# Patient Record
Sex: FEMALE | Race: WHITE | NOT HISPANIC OR LATINO | Employment: PART TIME | URBAN - METROPOLITAN AREA
[De-identification: names, ages, dates, MRNs, and addresses within clinical notes are randomized per-mention and may not be internally consistent; named-entity substitution may affect disease eponyms.]

---

## 2023-06-15 LAB
ALANINE AMINOTRANSFERASE (SGPT) (U/L) IN SER/PLAS: 15 U/L (ref 7–45)
ALBUMIN (G/DL) IN SER/PLAS: 4.3 G/DL (ref 3.4–5)
ALKALINE PHOSPHATASE (U/L) IN SER/PLAS: 63 U/L (ref 33–110)
ANION GAP IN SER/PLAS: 13 MMOL/L (ref 10–20)
ASPARTATE AMINOTRANSFERASE (SGOT) (U/L) IN SER/PLAS: 15 U/L (ref 9–39)
BILIRUBIN TOTAL (MG/DL) IN SER/PLAS: 0.5 MG/DL (ref 0–1.2)
CALCIUM (MG/DL) IN SER/PLAS: 9.5 MG/DL (ref 8.6–10.3)
CARBON DIOXIDE, TOTAL (MMOL/L) IN SER/PLAS: 26 MMOL/L (ref 21–32)
CHLORIDE (MMOL/L) IN SER/PLAS: 101 MMOL/L (ref 98–107)
CHOLESTEROL (MG/DL) IN SER/PLAS: 157 MG/DL (ref 0–199)
CHOLESTEROL IN HDL (MG/DL) IN SER/PLAS: 68.4 MG/DL
CHOLESTEROL/HDL RATIO: 2.3
CREATININE (MG/DL) IN SER/PLAS: 1.02 MG/DL (ref 0.5–1.05)
GFR FEMALE: 68 ML/MIN/1.73M2
GLUCOSE (MG/DL) IN SER/PLAS: 72 MG/DL (ref 74–99)
LDL: 53 MG/DL (ref 0–99)
POTASSIUM (MMOL/L) IN SER/PLAS: 4 MMOL/L (ref 3.5–5.3)
PROTEIN TOTAL: 7.3 G/DL (ref 6.4–8.2)
SODIUM (MMOL/L) IN SER/PLAS: 136 MMOL/L (ref 136–145)
THYROTROPIN (MIU/L) IN SER/PLAS BY DETECTION LIMIT <= 0.05 MIU/L: 0.89 MIU/L (ref 0.44–3.98)
TRIGLYCERIDE (MG/DL) IN SER/PLAS: 177 MG/DL (ref 0–149)
URATE (MG/DL) IN SER/PLAS: 3.6 MG/DL (ref 2.3–6.7)
UREA NITROGEN (MG/DL) IN SER/PLAS: 21 MG/DL (ref 6–23)
VLDL: 35 MG/DL (ref 0–40)

## 2023-06-19 LAB
AMPHETAMINE (PRESENCE) IN URINE BY SCREEN METHOD: ABNORMAL
BARBITURATES PRESENCE IN URINE BY SCREEN METHOD: ABNORMAL
BENZODIAZEPINE (PRESENCE) IN URINE BY SCREEN METHOD: ABNORMAL
CANNABINOIDS IN URINE BY SCREEN METHOD: ABNORMAL
COCAINE (PRESENCE) IN URINE BY SCREEN METHOD: ABNORMAL
DRUG SCREEN COMMENT URINE: ABNORMAL
FENTANYL URINE: ABNORMAL
METHADONE (PRESENCE) IN URINE BY SCREEN METHOD: ABNORMAL
OPIATES (PRESENCE) IN URINE BY SCREEN METHOD: ABNORMAL
OXYCODONE (PRESENCE) IN URINE BY SCREEN METHOD: ABNORMAL
PHENCYCLIDINE (PRESENCE) IN URINE BY SCREEN METHOD: ABNORMAL

## 2023-06-22 LAB
AMPHETAMINES,URINE: >5000 NG/ML
MDA,URINE: <200 NG/ML
MDEA,URINE: <200 NG/ML
MDMA,UR: <200 NG/ML
METHAMPHETAMINE QUANTITATIVE URINE: <200 NG/ML
PHENTERMINE,UR: <200 NG/ML

## 2023-06-23 LAB
11-NOR-9-CARBOXY-THC, URN, QUANT: 391 NG/ML
7-AMINOCLONAZEPAM: <25 NG/ML
ALPHA-HYDROXYALPRAZOLAM: <25 NG/ML
ALPHA-HYDROXYMIDAZOLAM: <25 NG/ML
ALPRAZOLAM: <25 NG/ML
CHLORDIAZEPOXIDE: <25 NG/ML
CLONAZEPAM: <25 NG/ML
DIAZEPAM: <25 NG/ML
LORAZEPAM: <25 NG/ML
MIDAZOLAM: <25 NG/ML
NORDIAZEPAM: 297 NG/ML
OXAZEPAM: 539 NG/ML
TEMAZEPAM: 844 NG/ML

## 2023-08-01 LAB
7-AMINOCLONAZEPAM: <25 NG/ML
ALPHA-HYDROXYALPRAZOLAM: <25 NG/ML
ALPHA-HYDROXYMIDAZOLAM: <25 NG/ML
ALPRAZOLAM: <25 NG/ML
AMPHETAMINES,URINE: 1155 NG/ML
CHLORDIAZEPOXIDE: <25 NG/ML
CLONAZEPAM: <25 NG/ML
DIAZEPAM: <25 NG/ML
LORAZEPAM: <25 NG/ML
MDA,URINE: <200 NG/ML
MDEA,URINE: <200 NG/ML
MDMA,UR: <200 NG/ML
METHAMPHETAMINE QUANTITATIVE URINE: <200 NG/ML
MIDAZOLAM: <25 NG/ML
NORDIAZEPAM: 366 NG/ML
OXAZEPAM: >1000 NG/ML
PHENTERMINE,UR: <200 NG/ML
TEMAZEPAM: >1000 NG/ML

## 2023-08-02 LAB — 11-NOR-9-CARBOXY-THC, URN, QUANT: >500 NG/ML

## 2023-08-29 LAB
AMPHETAMINE (PRESENCE) IN URINE BY SCREEN METHOD: ABNORMAL
BARBITURATES PRESENCE IN URINE BY SCREEN METHOD: ABNORMAL
BENZODIAZEPINE (PRESENCE) IN URINE BY SCREEN METHOD: ABNORMAL
CANNABINOIDS IN URINE BY SCREEN METHOD: ABNORMAL
COCAINE (PRESENCE) IN URINE BY SCREEN METHOD: ABNORMAL
DRUG SCREEN COMMENT URINE: ABNORMAL
FENTANYL URINE: ABNORMAL
OPIATES (PRESENCE) IN URINE BY SCREEN METHOD: ABNORMAL
OXYCODONE (PRESENCE) IN URINE BY SCREEN METHOD: ABNORMAL
PHENCYCLIDINE (PRESENCE) IN URINE BY SCREEN METHOD: ABNORMAL

## 2023-09-01 LAB
7-AMINOCLONAZEPAM: <25 NG/ML
ALPHA-HYDROXYALPRAZOLAM: <25 NG/ML
ALPHA-HYDROXYMIDAZOLAM: <25 NG/ML
ALPRAZOLAM: <25 NG/ML
CHLORDIAZEPOXIDE: <25 NG/ML
CLONAZEPAM: <25 NG/ML
DIAZEPAM: <25 NG/ML
LORAZEPAM: <25 NG/ML
MIDAZOLAM: <25 NG/ML
NORDIAZEPAM: 56 NG/ML
OXAZEPAM: 201 NG/ML
TEMAZEPAM: 490 NG/ML

## 2023-09-02 LAB
11-NOR-9-CARBOXY-THC, URN, QUANT: 336 NG/ML
AMPHETAMINES,URINE: <50 NG/ML
MDA,URINE: <200 NG/ML
MDEA,URINE: <200 NG/ML
MDMA,UR: <200 NG/ML
METHAMPHETAMINE QUANTITATIVE URINE: <200 NG/ML
PHENTERMINE,UR: <200 NG/ML

## 2023-09-13 LAB — SARS-COV-2 RESULT: NOT DETECTED

## 2023-11-15 ENCOUNTER — OFFICE VISIT (OUTPATIENT)
Dept: PRIMARY CARE | Facility: CLINIC | Age: 48
End: 2023-11-15
Payer: COMMERCIAL

## 2023-11-15 VITALS
OXYGEN SATURATION: 99 % | TEMPERATURE: 98.3 F | WEIGHT: 233.6 LBS | DIASTOLIC BLOOD PRESSURE: 82 MMHG | SYSTOLIC BLOOD PRESSURE: 138 MMHG | HEIGHT: 63 IN | HEART RATE: 90 BPM | BODY MASS INDEX: 41.39 KG/M2

## 2023-11-15 DIAGNOSIS — E66.01 MORBID OBESITY WITH BMI OF 40.0-44.9, ADULT (MULTI): Primary | ICD-10-CM

## 2023-11-15 PROCEDURE — 99214 OFFICE O/P EST MOD 30 MIN: CPT | Performed by: FAMILY MEDICINE

## 2023-11-15 PROCEDURE — 3008F BODY MASS INDEX DOCD: CPT | Performed by: FAMILY MEDICINE

## 2023-11-15 RX ORDER — DEXTROAMPHETAMINE SACCHARATE, AMPHETAMINE ASPARTATE MONOHYDRATE, DEXTROAMPHETAMINE SULFATE AND AMPHETAMINE SULFATE 7.5; 7.5; 7.5; 7.5 MG/1; MG/1; MG/1; MG/1
CAPSULE, EXTENDED RELEASE ORAL
COMMUNITY
Start: 2010-07-21

## 2023-11-15 RX ORDER — ROSUVASTATIN CALCIUM 10 MG/1
10 TABLET, COATED ORAL DAILY
COMMUNITY
End: 2024-01-05 | Stop reason: SDUPTHER

## 2023-11-15 RX ORDER — LAMOTRIGINE 25 MG/1
TABLET ORAL
COMMUNITY
Start: 2016-02-05

## 2023-11-15 RX ORDER — TEMAZEPAM 15 MG/1
15 CAPSULE ORAL DAILY PRN
COMMUNITY

## 2023-11-15 RX ORDER — RAMELTEON 8 MG/1
8 TABLET ORAL DAILY PRN
COMMUNITY
Start: 2023-10-03

## 2023-11-15 RX ORDER — DULAGLUTIDE 0.75 MG/.5ML
0.75 INJECTION, SOLUTION SUBCUTANEOUS
Qty: 2 ML | Refills: 0 | Status: SHIPPED | OUTPATIENT
Start: 2023-11-15 | End: 2023-11-22 | Stop reason: ALTCHOICE

## 2023-11-15 RX ORDER — ARIPIPRAZOLE 20 MG/1
20 TABLET ORAL
COMMUNITY
Start: 2023-06-27

## 2023-11-15 RX ORDER — PRAZOSIN HYDROCHLORIDE 2 MG/1
2 CAPSULE ORAL NIGHTLY
COMMUNITY
Start: 2023-06-27

## 2023-11-15 NOTE — PROGRESS NOTES
"Subjective   Patient ID: Ariane Rhodes is a 47 y.o. female who presents for follow up after seeing Neurology and Psychiatry     HPI     States she has seen both Neurology and Psychiatry since her last visit here in the office. Is receiving treatment by her Psychiatrist at [facility] for her bipolar and ADHD, and receiving therapy. States she did see Dr. [facility], had MRI Brain which came back normal but is concerned because her previous diagnosis of Neurocognitive disorder and MRI in 2022 which showed white matter changes. Is concerned because she is still having memory issues. Has appointment scheduled with cognitive specialist next month.     Also would like to discuss weight loss medication options. Pt has lost approximately 26 lbs since spring time on her own with lifestyle changes such as intermittent fasting and decreased carbs and not eating fast food. Is interested in pharmacologic treatment at this time.     Review of Systems    Objective   /82   Pulse 90   Temp 36.8 °C (98.3 °F)   Ht 1.6 m (5' 3\")   Wt 106 kg (233 lb 9.6 oz)   SpO2 99%   BMI 41.38 kg/m²     Physical Exam  Constitutional:       General: She is awake.      Appearance: Normal appearance. She is well-developed.   HENT:      Head: Normocephalic and atraumatic.   Cardiovascular:      Rate and Rhythm: Normal rate.   Pulmonary:      Effort: Pulmonary effort is normal.   Musculoskeletal:      Cervical back: Normal range of motion and neck supple.      Right lower leg: No edema.      Left lower leg: No edema.   Skin:     General: Skin is warm and dry.      Findings: No lesion or rash.   Neurological:      General: No focal deficit present.      Mental Status: She is alert and oriented to person, place, and time.      Cranial Nerves: No facial asymmetry.      Motor: Motor function is intact.      Gait: Gait is intact.   Psychiatric:         Attention and Perception: Attention normal.         Mood and Affect: Mood and affect " normal.         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    Discussed neurocognitive disorder with pt at length, primarily going over previous results of her MRI and discussing interpretation of results. She does see neurology and psychiatry and has neuropsych evaluations, and will have more in the future to monitor progression of neurocognitive disorder. Believes she is in a manic episode currently d/t anxiety over the neurocognitive disorder, was asking about anything she can take that is natural like the medical marijuana she uses but possibly more cost-effective. Suggested Ashwagandha if she is looking for a natural supplement, but did caution to check with psychiatry about taking it with her other medications and strongly recommended doing her own research on this. She was also asking about TMS therapy, but did let her know that this is something that is better discussed with psychiatry. She is also planning on seeing a cognitive specialist on 12/6/23 to further discuss her cognitive deficits. Regarding weight loss, is unable to use Topamax. Suggested GLP-1s. Discussed side effects of medication and how the medication worked. Pt interested in this -- will call in Trulicity, will look into other options that are specifically for obesity if not approved d/t using this for Dx of obesity vs. DM. Will plan to see pt back 1 month after initiating the medication, also let her know that she can come in for education on how to use the medication if she is uncomfortable with starting herself.

## 2023-11-26 RX ORDER — SEMAGLUTIDE 0.25 MG/.5ML
0.25 INJECTION, SOLUTION SUBCUTANEOUS
Qty: 2 ML | Refills: 0 | Status: SHIPPED | OUTPATIENT
Start: 2023-11-26 | End: 2024-01-11

## 2023-12-01 DIAGNOSIS — M10.9 GOUT, UNSPECIFIED CAUSE, UNSPECIFIED CHRONICITY, UNSPECIFIED SITE: ICD-10-CM

## 2023-12-01 DIAGNOSIS — E03.9 HYPOTHYROIDISM, ADULT: ICD-10-CM

## 2023-12-01 DIAGNOSIS — I10 BENIGN HYPERTENSION: ICD-10-CM

## 2023-12-01 RX ORDER — ALLOPURINOL 300 MG/1
300 TABLET ORAL DAILY
Qty: 30 TABLET | Refills: 0 | Status: SHIPPED | OUTPATIENT
Start: 2023-12-01 | End: 2023-12-31

## 2023-12-01 RX ORDER — LOSARTAN POTASSIUM 50 MG/1
50 TABLET ORAL DAILY
Qty: 30 TABLET | Refills: 0 | Status: SHIPPED | OUTPATIENT
Start: 2023-12-01 | End: 2024-01-05 | Stop reason: SDUPTHER

## 2023-12-01 RX ORDER — LEVOTHYROXINE SODIUM 75 UG/1
75 TABLET ORAL DAILY
Qty: 30 TABLET | Refills: 0 | Status: SHIPPED | OUTPATIENT
Start: 2023-12-01 | End: 2024-01-05 | Stop reason: SDUPTHER

## 2023-12-05 ENCOUNTER — OFFICE VISIT (OUTPATIENT)
Dept: URGENT CARE | Facility: CLINIC | Age: 48
End: 2023-12-05
Payer: COMMERCIAL

## 2023-12-05 ENCOUNTER — TELEPHONE (OUTPATIENT)
Dept: PRIMARY CARE | Facility: CLINIC | Age: 48
End: 2023-12-05

## 2023-12-05 VITALS
HEART RATE: 100 BPM | WEIGHT: 232 LBS | BODY MASS INDEX: 41.11 KG/M2 | DIASTOLIC BLOOD PRESSURE: 86 MMHG | HEIGHT: 63 IN | SYSTOLIC BLOOD PRESSURE: 121 MMHG

## 2023-12-05 DIAGNOSIS — M10.479 OTHER SECONDARY ACUTE GOUT OF ANKLE, UNSPECIFIED LATERALITY: Primary | ICD-10-CM

## 2023-12-05 PROCEDURE — 96372 THER/PROPH/DIAG INJ SC/IM: CPT | Performed by: PHYSICIAN ASSISTANT

## 2023-12-05 PROCEDURE — 99213 OFFICE O/P EST LOW 20 MIN: CPT | Performed by: PHYSICIAN ASSISTANT

## 2023-12-05 PROCEDURE — 3008F BODY MASS INDEX DOCD: CPT | Performed by: PHYSICIAN ASSISTANT

## 2023-12-05 RX ORDER — OLANZAPINE 10 MG/1
10 TABLET ORAL NIGHTLY
COMMUNITY
Start: 2023-12-01

## 2023-12-05 RX ORDER — PREDNISONE 20 MG/1
TABLET ORAL
Qty: 12 TABLET | Refills: 0 | Status: SHIPPED | OUTPATIENT
Start: 2023-12-05 | End: 2023-12-11

## 2023-12-05 RX ORDER — DICLOFENAC POTASSIUM 50 MG/1
TABLET, FILM COATED ORAL
COMMUNITY
Start: 2023-11-03

## 2023-12-05 RX ORDER — LITHIUM CARBONATE 600 MG/1
CAPSULE ORAL
COMMUNITY
Start: 2023-12-04 | End: 2024-01-11 | Stop reason: WASHOUT

## 2023-12-05 RX ORDER — CLONIDINE HYDROCHLORIDE 0.1 MG/1
TABLET ORAL
COMMUNITY
Start: 2023-11-22

## 2023-12-05 RX ORDER — DIAZEPAM 10 MG/1
1 TABLET ORAL 2 TIMES DAILY PRN
COMMUNITY

## 2023-12-05 RX ORDER — KETOROLAC TROMETHAMINE 30 MG/ML
60 INJECTION, SOLUTION INTRAMUSCULAR; INTRAVENOUS ONCE
Status: COMPLETED | OUTPATIENT
Start: 2023-12-05 | End: 2023-12-05

## 2023-12-05 RX ADMIN — KETOROLAC TROMETHAMINE 60 MG: 30 INJECTION, SOLUTION INTRAMUSCULAR; INTRAVENOUS at 19:57

## 2023-12-05 NOTE — TELEPHONE ENCOUNTER
Cover my meds Denied Wegovy 0.25mg/0.5 ml.  Would you like to send in a different medication or try to appeal?

## 2023-12-06 NOTE — TELEPHONE ENCOUNTER
Printed up the appeal form and put on your desk. I can type info into Cover My Meds if you are not able to get on to fill out.

## 2023-12-06 NOTE — PROGRESS NOTES
"Subjective     Ariane Rhodes is a 47 y.o. female who presents for Ankle Pain.    States that she started 2 days ago with left posterior ankle and left lateral ankle swelling, redness, warmth x 2 days. She has a Hx of gout attacks, had one just 2 weeks ago. She just had steak and shrimp just a few days prior to getting this most recent attack. Tried to take 2 prednisone without much relief. \"Feels like a lot of pressure,\" and the pain is worse with walking on it. Denies fever, chills, red streaking, injury, numbness/tingling. Feels similar to her previous gout attacks.           All other systems reviewed and are negative unless otherwise noted in the HPI.    /86 (BP Location: Right arm, Patient Position: Sitting, BP Cuff Size: Large adult)   Pulse 100   Ht 1.6 m (5' 3\")   Wt 105 kg (232 lb)   BMI 41.10 kg/m²    Vital signs have been reviewed and are stable.     Objective     Physical Exam  Constitutional:       General: She is awake.      Appearance: Normal appearance. She is well-developed.   HENT:      Head: Normocephalic and atraumatic.   Cardiovascular:      Rate and Rhythm: Normal rate.   Pulmonary:      Effort: Pulmonary effort is normal.   Musculoskeletal:      Cervical back: Normal range of motion and neck supple.      Right lower leg: No edema.      Left lower leg: No edema.      Right ankle: Swelling present. No tenderness. Normal pulse.      Right Achilles Tendon: Tenderness present. No defects. Bright's test negative.      Left ankle: Swelling (lateral side) present. Tenderness present over the lateral malleolus. Normal pulse.      Left Achilles Tendon: No tenderness or defects. Bright's test negative.      Right foot: Bunion present.   Skin:     General: Skin is warm and dry.      Findings: No lesion or rash.   Neurological:      General: No focal deficit present.      Mental Status: She is alert and oriented to person, place, and time.      Cranial Nerves: No facial asymmetry.      " Motor: Motor function is intact.      Gait: Gait is intact.   Psychiatric:         Attention and Perception: Attention normal.         Mood and Affect: Mood and affect normal.         Assessment/Plan   Problem List Items Addressed This Visit    None  Visit Diagnoses       Other secondary acute gout of ankle, unspecified laterality    -  Primary    Relevant Medications    ketorolac (Toradol) injection 60 mg (Completed)    predniSONE (Deltasone) 20 mg tablet          Pt already took 40 mg of prednisone today. Toradol IM injection given here in office. Begin new round of prednisone tomorrow. Prop and ice feet when you can. Avoid high purine foods.     Red flag symptoms reviewed with patient and all questions answered. Patient or parent/guardian verbalized understanding and agreement with care plan as above. All in office testing reviewed with patient. If symptoms worsen or do not improve, patient is to follow up with PCP or report to the ER.

## 2023-12-10 DIAGNOSIS — M10.479 OTHER SECONDARY ACUTE GOUT OF ANKLE, UNSPECIFIED LATERALITY: Primary | ICD-10-CM

## 2023-12-10 RX ORDER — INDOMETHACIN 50 MG/1
CAPSULE ORAL
Qty: 18 CAPSULE | Refills: 0 | Status: SHIPPED | OUTPATIENT
Start: 2023-12-10 | End: 2023-12-20 | Stop reason: SDUPTHER

## 2023-12-10 NOTE — PROGRESS NOTES
Pt calling, states that she took the prednisone that she was given for her gout flare when she was seen on 12/05/2023 and was feeling better; however, she woke up this morning and it was feeling worse again. She is unable to come in due to working all day today and she is leaving to go on vacation tomorrow. Indomethacin to be called in to try. Advised ice. Continue with allopurinol. Pt to call if any additional issues. Sent to [facility] per pt request d/t regular pharmacy being closed today.

## 2023-12-20 ENCOUNTER — ANCILLARY PROCEDURE (OUTPATIENT)
Dept: RADIOLOGY | Facility: CLINIC | Age: 48
End: 2023-12-20
Payer: COMMERCIAL

## 2023-12-20 ENCOUNTER — OFFICE VISIT (OUTPATIENT)
Dept: URGENT CARE | Facility: CLINIC | Age: 48
End: 2023-12-20
Payer: COMMERCIAL

## 2023-12-20 VITALS — TEMPERATURE: 97.8 F | DIASTOLIC BLOOD PRESSURE: 86 MMHG | SYSTOLIC BLOOD PRESSURE: 131 MMHG | HEART RATE: 100 BPM

## 2023-12-20 DIAGNOSIS — M1A.0720 CHRONIC GOUT OF LEFT FOOT, UNSPECIFIED CAUSE: Primary | ICD-10-CM

## 2023-12-20 DIAGNOSIS — M1A.0720 CHRONIC GOUT OF LEFT FOOT, UNSPECIFIED CAUSE: ICD-10-CM

## 2023-12-20 DIAGNOSIS — M1A.0710 CHRONIC GOUT OF RIGHT ANKLE, UNSPECIFIED CAUSE: ICD-10-CM

## 2023-12-20 PROBLEM — K21.9 GERD (GASTROESOPHAGEAL REFLUX DISEASE): Status: ACTIVE | Noted: 2017-06-01

## 2023-12-20 PROBLEM — G43.909 MIGRAINE: Status: ACTIVE | Noted: 2023-03-28

## 2023-12-20 PROBLEM — G31.84 MCI (MILD COGNITIVE IMPAIRMENT): Status: ACTIVE | Noted: 2023-06-27

## 2023-12-20 PROBLEM — E78.5 HYPERLIPIDEMIA: Status: ACTIVE | Noted: 2023-03-28

## 2023-12-20 PROBLEM — F41.9 ANXIETY: Status: ACTIVE | Noted: 2022-04-27

## 2023-12-20 PROBLEM — F10.11 HISTORY OF ALCOHOL ABUSE: Status: RESOLVED | Noted: 2023-08-04 | Resolved: 2023-12-20

## 2023-12-20 PROBLEM — F43.10 PTSD (POST-TRAUMATIC STRESS DISORDER): Status: ACTIVE | Noted: 2023-08-04

## 2023-12-20 PROBLEM — R41.3 MEMORY LOSS: Status: ACTIVE | Noted: 2023-08-04

## 2023-12-20 PROBLEM — F09 COGNITIVE DISORDER: Status: ACTIVE | Noted: 2023-03-28

## 2023-12-20 PROBLEM — E51.2 WERNICKE ENCEPHALOPATHY SYNDROME: Status: ACTIVE | Noted: 2023-08-04

## 2023-12-20 PROBLEM — G47.00 INSOMNIA: Status: ACTIVE | Noted: 2022-04-01

## 2023-12-20 PROBLEM — G62.9 NEUROPATHY: Status: ACTIVE | Noted: 2023-03-28

## 2023-12-20 PROBLEM — F90.9 ADHD (ATTENTION DEFICIT HYPERACTIVITY DISORDER): Status: ACTIVE | Noted: 2023-08-04

## 2023-12-20 PROBLEM — E66.01 OBESITIES, MORBID (MULTI): Status: ACTIVE | Noted: 2021-08-03

## 2023-12-20 PROBLEM — U07.1 COVID-19: Status: RESOLVED | Noted: 2021-08-24 | Resolved: 2023-12-20

## 2023-12-20 PROBLEM — F32.A DEPRESSION: Status: ACTIVE | Noted: 2023-08-04

## 2023-12-20 PROBLEM — G47.33 OSA ON CPAP: Status: ACTIVE | Noted: 2023-06-27

## 2023-12-20 PROBLEM — R51.9 HEADACHE: Status: ACTIVE | Noted: 2023-03-28

## 2023-12-20 PROCEDURE — 99213 OFFICE O/P EST LOW 20 MIN: CPT

## 2023-12-20 PROCEDURE — 3079F DIAST BP 80-89 MM HG: CPT

## 2023-12-20 PROCEDURE — 3075F SYST BP GE 130 - 139MM HG: CPT

## 2023-12-20 PROCEDURE — 3008F BODY MASS INDEX DOCD: CPT

## 2023-12-20 PROCEDURE — 73630 X-RAY EXAM OF FOOT: CPT | Mod: 50

## 2023-12-20 PROCEDURE — 73630 X-RAY EXAM OF FOOT: CPT | Mod: BILATERAL PROCEDURE | Performed by: RADIOLOGY

## 2023-12-20 RX ORDER — COLCHICINE 0.6 MG/1
TABLET ORAL
Qty: 9 TABLET | Refills: 0 | Status: SHIPPED | OUTPATIENT
Start: 2023-12-20 | End: 2024-05-15 | Stop reason: WASHOUT

## 2023-12-20 RX ORDER — ACAMPROSATE CALCIUM 333 MG/1
TABLET, DELAYED RELEASE ORAL
COMMUNITY
Start: 2015-08-04 | End: 2024-01-11 | Stop reason: ALTCHOICE

## 2023-12-20 RX ORDER — INDOMETHACIN 50 MG/1
CAPSULE ORAL
Qty: 18 CAPSULE | Refills: 0 | Status: SHIPPED | OUTPATIENT
Start: 2023-12-20 | End: 2023-12-26

## 2023-12-20 RX ORDER — PREDNISONE 20 MG/1
TABLET ORAL
Qty: 24 TABLET | Refills: 0 | Status: SHIPPED | OUTPATIENT
Start: 2023-12-20 | End: 2024-01-03

## 2023-12-20 NOTE — PROGRESS NOTES
Subjective     Ariane Rhodes is a 48 y.o. female who presents for Gout.    Patient presents with acute gout flare of her right ankle for the last month. She reports that she has been on allopurinol for years, and was prescribed prednisone and colchicine for recent flare in left foot, which helped, but then her symptoms returned.       History provided by:  Patient and medical records      /86   Pulse 100   Temp 36.6 °C (97.8 °F)   LMP  (LMP Unknown)    All vitals have been reviewed and are stable.    Review of Systems   Constitutional: Negative.  Negative for chills, diaphoresis, fatigue and fever.   HENT: Negative.  Negative for congestion, ear pain, rhinorrhea, sinus pressure, sore throat and trouble swallowing.    Respiratory:  Negative for cough and shortness of breath.    Cardiovascular: Negative.  Negative for chest pain and palpitations.   Gastrointestinal: Negative.  Negative for abdominal pain, diarrhea, nausea and vomiting.   Musculoskeletal:  Positive for arthralgias and gait problem. Negative for myalgias and neck pain.   Skin:  Positive for color change. Negative for rash and wound.   Neurological:  Negative for dizziness and headaches.       Objective   Physical Exam  Vitals and nursing note reviewed.   Constitutional:       General: She is awake. She is not in acute distress.     Appearance: Normal appearance. She is well-developed.   HENT:      Head: Normocephalic and atraumatic.      Nose: Nose normal.      Mouth/Throat:      Lips: Pink.      Mouth: Mucous membranes are moist.      Pharynx: Oropharynx is clear.   Eyes:      Extraocular Movements: Extraocular movements intact.      Conjunctiva/sclera: Conjunctivae normal.      Pupils: Pupils are equal, round, and reactive to light.   Cardiovascular:      Rate and Rhythm: Normal rate and regular rhythm.   Pulmonary:      Effort: Pulmonary effort is normal. No respiratory distress.   Abdominal:      General: Abdomen is flat. There is no  distension.   Musculoskeletal:         General: No swelling or deformity.      Cervical back: Normal range of motion.      Right lower leg: Normal.      Left lower leg: Normal.      Right ankle: Swelling present. No tenderness. Normal range of motion.      Left ankle: Swelling present. Tenderness present.      Right foot: Normal.      Left foot: Decreased range of motion. Swelling and tenderness present.   Skin:     General: Skin is warm and dry.   Neurological:      General: No focal deficit present.      Mental Status: She is alert and oriented to person, place, and time. Mental status is at baseline.      Motor: Motor function is intact.      Coordination: Coordination is intact.   Psychiatric:         Mood and Affect: Mood and affect normal.         Speech: Speech normal.         Behavior: Behavior normal.         Thought Content: Thought content normal.         Judgment: Judgment normal.         Assessment/Plan   Problem List Items Addressed This Visit       Gout - Primary    Relevant Medications    predniSONE (Deltasone) 20 mg tablet    colchicine 0.6 mg tablet    Other Relevant Orders    XR foot 3+ views bilateral (Completed)    XR foot 3+ views bilateral (Completed)       Red flags for reporting to ER have been reviewed with the patient.    Current diagnosis, any medication changes, and all in-office lab or radiologic results have been reviewed with the patient at the time of the visit.   If symptoms do not improve or worsen, patient is to follow up with PCP or report to the emergency room.   Patient is alert and oriented x3 and non-toxic appearing. Vital signs are stable.   Patient and/or guardian has sufficient decision-making capabilities at this time and reports understanding and agreement with the treatment plan made through shared decision-making.

## 2023-12-22 DIAGNOSIS — T14.8XXA AVULSION FRACTURE: Primary | ICD-10-CM

## 2024-01-01 ASSESSMENT — ENCOUNTER SYMPTOMS
RHINORRHEA: 0
DIARRHEA: 0
FATIGUE: 0
CARDIOVASCULAR NEGATIVE: 1
ARTHRALGIAS: 1
SINUS PRESSURE: 0
COUGH: 0
NECK PAIN: 0
HEADACHES: 0
TROUBLE SWALLOWING: 0
SHORTNESS OF BREATH: 0
VOMITING: 0
MYALGIAS: 0
CONSTITUTIONAL NEGATIVE: 1
GASTROINTESTINAL NEGATIVE: 1
DIZZINESS: 0
CHILLS: 0
DIAPHORESIS: 0
ABDOMINAL PAIN: 0
SORE THROAT: 0
NAUSEA: 0
WOUND: 0
COLOR CHANGE: 1
PALPITATIONS: 0
FEVER: 0

## 2024-01-04 ENCOUNTER — OFFICE VISIT (OUTPATIENT)
Dept: ORTHOPEDIC SURGERY | Facility: CLINIC | Age: 49
End: 2024-01-04
Payer: COMMERCIAL

## 2024-01-04 VITALS — HEIGHT: 63 IN | BODY MASS INDEX: 41.11 KG/M2 | WEIGHT: 232 LBS

## 2024-01-04 DIAGNOSIS — T14.8XXA AVULSION FRACTURE: ICD-10-CM

## 2024-01-04 PROCEDURE — 99204 OFFICE O/P NEW MOD 45 MIN: CPT | Performed by: SPECIALIST

## 2024-01-04 PROCEDURE — L4397 STATIC OR DYNAMI AFO PRE OTS: HCPCS | Performed by: SPECIALIST

## 2024-01-04 PROCEDURE — 3008F BODY MASS INDEX DOCD: CPT | Performed by: SPECIALIST

## 2024-01-04 NOTE — PROGRESS NOTES
Bilateral foot pain for about 1.5 months   Has gout and states she had an attack at that time, but the pain has not gone away since then.   Was given prednisone but it hasn't changed her symptoms.   Pain is in the bottoms of her feet.   Worse in the morning when she wakes up, gets better as the day goes on.   Tylenol does not help.    Xrays done at urgent care    History of Present Illness: Her pain is actually more identifiable at the insertion of her Achilles tendon posterior heels bilateral.  Right greater than left.  No history of trauma.      On examination of the bilateral ankle/foot:  Minimally antalgic gait.  Is able to heel rise with some posterior pain  Neutral alignment  Mild posterior retrocalcaneal right greater than left swelling; no ecchymosis or erythema. Skin intact; no ulcers or lesions.   Moderate gastroc tightness noted, with pain increased with passive dorsiflexion and retrocalcaneal region.  Normal strength in ankle plantarflexion, dorsiflexion, inversion and eversion  Tenderness to palpation, with fullness noted to region of insertional Achilles pain.  No tenderness to palpation in other regions of the foot and ankle.  Negative Bright test  Neurovascularly intact. Good capillary refill. No sensory deficit to light touch. Normal proprioception. Dorsalis pedis and posterior tibial pulses 2+ bilaterally.        I personally reviewed the patient's x-ray images and reports of the bilateral ankle/foot. The xrays show right greater than left insertional Achilles osteophytes    ASSESSMENT: Bilateral ankle Achilles tendonitis/tendinosis    PLAN: Treatment options were discussed with the patient. PThe patient was given a prescription for home therapy program.  They should do these exercises 3 times per day for 6 weeks and then daily.  When pain under control, patient can begin weaning from the night splint to supportive shoes with felt heel lifts.  They will then continue the outlined stretching and  icing program.  Patient can use OTC aspercream for pain, and or NSAIDs if approved by their primary care. All the patient's questions were answered. The patient agrees with the above plan.  Follow up in 4-6 weeks or as needed        This note was dictated using speech recognition software and was not corrected for spelling or grammatical errors

## 2024-01-05 DIAGNOSIS — E03.9 HYPOTHYROIDISM, ADULT: ICD-10-CM

## 2024-01-05 DIAGNOSIS — I10 BENIGN HYPERTENSION: ICD-10-CM

## 2024-01-05 DIAGNOSIS — E78.5 HYPERLIPIDEMIA, UNSPECIFIED HYPERLIPIDEMIA TYPE: ICD-10-CM

## 2024-01-05 RX ORDER — LEVOTHYROXINE SODIUM 75 UG/1
75 TABLET ORAL DAILY
Qty: 30 TABLET | Refills: 0 | Status: SHIPPED | OUTPATIENT
Start: 2024-01-05 | End: 2024-01-17 | Stop reason: SDUPTHER

## 2024-01-05 RX ORDER — LOSARTAN POTASSIUM 50 MG/1
50 TABLET ORAL DAILY
Qty: 30 TABLET | Refills: 0 | Status: SHIPPED | OUTPATIENT
Start: 2024-01-05 | End: 2024-01-17 | Stop reason: SDUPTHER

## 2024-01-05 RX ORDER — ROSUVASTATIN CALCIUM 10 MG/1
10 TABLET, COATED ORAL DAILY
Qty: 30 TABLET | Refills: 0 | Status: SHIPPED | OUTPATIENT
Start: 2024-01-05 | End: 2024-01-17 | Stop reason: SDUPTHER

## 2024-01-05 RX ORDER — NAPROXEN 375 MG/1
375 TABLET ORAL
Qty: 60 TABLET | Refills: 0 | Status: SHIPPED | OUTPATIENT
Start: 2024-01-05 | End: 2024-02-04

## 2024-01-11 ENCOUNTER — TELEPHONE (OUTPATIENT)
Dept: PRIMARY CARE | Facility: CLINIC | Age: 49
End: 2024-01-11

## 2024-01-11 ENCOUNTER — OFFICE VISIT (OUTPATIENT)
Dept: URGENT CARE | Facility: CLINIC | Age: 49
End: 2024-01-11
Payer: COMMERCIAL

## 2024-01-11 VITALS
HEART RATE: 73 BPM | OXYGEN SATURATION: 95 % | WEIGHT: 229.8 LBS | DIASTOLIC BLOOD PRESSURE: 88 MMHG | SYSTOLIC BLOOD PRESSURE: 125 MMHG | HEIGHT: 63 IN | TEMPERATURE: 98.7 F | BODY MASS INDEX: 40.72 KG/M2

## 2024-01-11 DIAGNOSIS — M1A.9XX0 CHRONIC GOUT WITHOUT TOPHUS, UNSPECIFIED CAUSE, UNSPECIFIED SITE: Primary | ICD-10-CM

## 2024-01-11 DIAGNOSIS — M54.42 CHRONIC RIGHT-SIDED LOW BACK PAIN WITH BILATERAL SCIATICA: Primary | ICD-10-CM

## 2024-01-11 DIAGNOSIS — G89.29 CHRONIC RIGHT-SIDED LOW BACK PAIN WITH BILATERAL SCIATICA: Primary | ICD-10-CM

## 2024-01-11 DIAGNOSIS — M54.41 CHRONIC RIGHT-SIDED LOW BACK PAIN WITH BILATERAL SCIATICA: Primary | ICD-10-CM

## 2024-01-11 PROCEDURE — 3074F SYST BP LT 130 MM HG: CPT

## 2024-01-11 PROCEDURE — 3079F DIAST BP 80-89 MM HG: CPT

## 2024-01-11 PROCEDURE — 3008F BODY MASS INDEX DOCD: CPT

## 2024-01-11 PROCEDURE — 99213 OFFICE O/P EST LOW 20 MIN: CPT

## 2024-01-11 RX ORDER — POTASSIUM CHLORIDE 750 MG/1
10 TABLET, FILM COATED, EXTENDED RELEASE ORAL
COMMUNITY
Start: 2015-08-04 | End: 2024-01-17 | Stop reason: ALTCHOICE

## 2024-01-11 RX ORDER — DILTIAZEM HYDROCHLORIDE 180 MG/1
180 CAPSULE, COATED, EXTENDED RELEASE ORAL
COMMUNITY
Start: 2015-08-04 | End: 2024-01-17 | Stop reason: ALTCHOICE

## 2024-01-11 RX ORDER — METHOCARBAMOL 750 MG/1
750 TABLET, FILM COATED ORAL
COMMUNITY
Start: 2016-05-19 | End: 2024-01-17 | Stop reason: ALTCHOICE

## 2024-01-11 RX ORDER — FLUOXETINE HYDROCHLORIDE 20 MG/1
20 CAPSULE ORAL
COMMUNITY
Start: 2015-08-04 | End: 2024-01-17 | Stop reason: ALTCHOICE

## 2024-01-11 RX ORDER — FUROSEMIDE 20 MG/1
TABLET ORAL
COMMUNITY
Start: 2016-02-21 | End: 2024-01-17 | Stop reason: ALTCHOICE

## 2024-01-11 RX ORDER — LISINOPRIL 40 MG/1
40 TABLET ORAL
COMMUNITY
Start: 2015-08-04 | End: 2024-01-17 | Stop reason: ALTCHOICE

## 2024-01-11 RX ORDER — FAMOTIDINE 40 MG/1
1 TABLET, FILM COATED ORAL DAILY
COMMUNITY
Start: 2023-06-02 | End: 2024-01-17 | Stop reason: SDUPTHER

## 2024-01-11 RX ORDER — PERMETHRIN 50 MG/G
CREAM TOPICAL
COMMUNITY
Start: 2016-01-19 | End: 2024-01-17 | Stop reason: ALTCHOICE

## 2024-01-11 RX ORDER — LITHIUM CARBONATE 300 MG/1
CAPSULE ORAL
COMMUNITY
Start: 2024-01-02

## 2024-01-11 RX ORDER — QUETIAPINE FUMARATE 300 MG/1
1 TABLET, FILM COATED ORAL NIGHTLY
COMMUNITY
End: 2024-01-17 | Stop reason: ALTCHOICE

## 2024-01-11 RX ORDER — TRIAMCINOLONE ACETONIDE 1 MG/G
CREAM TOPICAL
COMMUNITY
Start: 2016-01-19 | End: 2024-01-17 | Stop reason: ALTCHOICE

## 2024-01-11 RX ORDER — DOXEPIN HYDROCHLORIDE 25 MG/1
CAPSULE ORAL
COMMUNITY
Start: 2016-05-03 | End: 2024-01-17 | Stop reason: ALTCHOICE

## 2024-01-11 RX ORDER — OXCARBAZEPINE 150 MG/1
TABLET, FILM COATED ORAL
COMMUNITY
Start: 2023-09-12 | End: 2024-01-17 | Stop reason: ALTCHOICE

## 2024-01-11 RX ORDER — GABAPENTIN 300 MG/1
300 CAPSULE ORAL 3 TIMES DAILY
COMMUNITY
Start: 2016-05-19 | End: 2024-01-17 | Stop reason: ALTCHOICE

## 2024-01-11 RX ORDER — DIVALPROEX SODIUM 500 MG/1
TABLET, DELAYED RELEASE ORAL
COMMUNITY
Start: 2015-08-04 | End: 2024-01-17 | Stop reason: ALTCHOICE

## 2024-01-11 RX ORDER — QUETIAPINE FUMARATE 100 MG/1
1 TABLET, FILM COATED ORAL 2 TIMES DAILY
COMMUNITY
End: 2024-01-17 | Stop reason: ALTCHOICE

## 2024-01-11 RX ORDER — BUPROPION HYDROCHLORIDE 150 MG/1
TABLET ORAL
COMMUNITY
Start: 2023-12-18

## 2024-01-11 RX ORDER — OMEPRAZOLE 40 MG/1
1 CAPSULE, DELAYED RELEASE ORAL
COMMUNITY
Start: 2016-05-19 | End: 2024-01-17 | Stop reason: ALTCHOICE

## 2024-01-11 RX ORDER — PAROXETINE HYDROCHLORIDE 40 MG/1
TABLET, FILM COATED ORAL
COMMUNITY
Start: 2023-09-12 | End: 2024-01-17 | Stop reason: ALTCHOICE

## 2024-01-11 RX ORDER — LIDOCAINE 50 MG/G
1 PATCH TOPICAL DAILY
Qty: 10 PATCH | Refills: 0 | Status: SHIPPED | OUTPATIENT
Start: 2024-01-11 | End: 2024-01-21

## 2024-01-11 RX ORDER — PREDNISONE 10 MG/1
TABLET ORAL
Qty: 15 TABLET | Refills: 0 | Status: SHIPPED | OUTPATIENT
Start: 2024-01-11 | End: 2024-01-19

## 2024-01-11 RX ORDER — ALBUTEROL SULFATE 90 UG/1
2 AEROSOL, METERED RESPIRATORY (INHALATION) EVERY 6 HOURS PRN
COMMUNITY
Start: 2022-12-16

## 2024-01-11 RX ORDER — ERGOCALCIFEROL 1.25 MG/1
50000 CAPSULE ORAL
COMMUNITY
Start: 2016-02-01 | End: 2024-01-17 | Stop reason: SDUPTHER

## 2024-01-11 RX ORDER — METHYLPHENIDATE HYDROCHLORIDE 20 MG/1
TABLET ORAL
COMMUNITY
Start: 2016-01-15 | End: 2024-01-17 | Stop reason: ALTCHOICE

## 2024-01-11 RX ORDER — IBUPROFEN 800 MG/1
800 TABLET ORAL
COMMUNITY
Start: 2015-08-04

## 2024-01-11 RX ORDER — PROPRANOLOL HYDROCHLORIDE 10 MG/1
10 TABLET ORAL 2 TIMES DAILY PRN
COMMUNITY
Start: 2023-04-01

## 2024-01-11 RX ORDER — ALLOPURINOL 300 MG/1
1 TABLET ORAL
COMMUNITY
End: 2024-01-12 | Stop reason: SDUPTHER

## 2024-01-11 RX ORDER — HYDROXYZINE HYDROCHLORIDE 50 MG/1
2 TABLET, FILM COATED ORAL
COMMUNITY

## 2024-01-11 ASSESSMENT — ENCOUNTER SYMPTOMS
DYSURIA: 0
NUMBNESS: 0
HEADACHES: 0
ABDOMINAL PAIN: 0
PERIANAL NUMBNESS: 0
LEG PAIN: 0
WEAKNESS: 1
FEVER: 0
BACK PAIN: 1
BOWEL INCONTINENCE: 0
TINGLING: 0

## 2024-01-11 NOTE — LETTER
January 11, 2024     Patient: Ariane Rhodes   YOB: 1975   Date of Visit: 1/11/2024       To Whom It May Concern:    Ariane Rhodes was seen in my clinic on 1/11/2024 at 9:50 am. Please excuse Ariane for her absence from work 1/11/24-1/13/24. She may return 1/15/24.     If you have any questions or concerns, please don't hesitate to call.         Sincerely,         Ariella Ha PA-C        CC: No Recipients

## 2024-01-11 NOTE — PATIENT INSTRUCTIONS
BACK PAIN / MUSCLE SPASM    - PREDNISONE (steroid) has been prescribed to reduce nerve inflammation and pain  - May continue muscle relaxer as needed    - Resting the affected muscle is critical to healing  - NSAIDs (Ibuprofen or naproxen) may be taken every 4-6 hours for pain relief and decreased inflammation  - Use Ice (or other cold object) for at least 6 hours after the injury. Some people find it helpful to ice up to 2 days after an injury. Use cold gel pack, bag of ice, or bag of frozen vegetables on the painful muscle for 15 min, every 1 to 2 hours.  Put a thin towel between the ice (or other cold object) and your skin.  - Alternate icing and heating if possible with heating pads or warm water  - Muscle strains often take 3-6 weeks to heal, decrease activity until pain free  - Gentle stretching without weight bearing can help prevent immobility while activity is decreased  - Yoga or seeking out chiropractor care may be beneficial  - If loss of bladder or bowel control, numbness of groin region, numbness or weakness of lower extremities occurs, go to the ER immediately    Follow up with PCP in 4-6 weeks, if you need a referral, please call our office.

## 2024-01-11 NOTE — PROGRESS NOTES
"Subjective     Ariane Rhodes is a 48 y.o. female who presents for Back Pain.    Patient presents with flare of chronic back pain worse on the right lower side since yesterday.  She reports that she has been having pain in her feet and she believes compensating the way she is walking lately has triggered the pain. She reports trying prescribed naproxen, ibuprofen, tylenol, flexeril and tizanidine with minimal relief.       History provided by:  Patient and medical records  Back Pain  This is a chronic problem. The current episode started yesterday. The problem occurs constantly. The problem has been gradually worsening since onset. The pain is present in the thoracic spine. The quality of the pain is described as shooting and aching. Radiates to: right upper back. The pain is at a severity of 8/10. The pain is severe. The pain is The same all the time. The symptoms are aggravated by bending, lying down, sitting, stress, twisting and standing. Stiffness is present In the morning. Associated symptoms include weakness. Pertinent negatives include no abdominal pain, bladder incontinence, bowel incontinence, chest pain, dysuria, fever, headaches, leg pain, numbness, pelvic pain, perianal numbness or tingling. Risk factors include lack of exercise and poor posture. She has tried ice, NSAIDs and muscle relaxant for the symptoms. The treatment provided no relief.       /88 (BP Location: Left arm, Patient Position: Sitting, BP Cuff Size: Large adult)   Pulse 73   Temp 37.1 °C (98.7 °F) (Oral)   Ht 1.6 m (5' 3\")   Wt 104 kg (229 lb 12.8 oz)   LMP  (LMP Unknown)   SpO2 95%   BMI 40.71 kg/m²    All vitals have been reviewed and are stable.    Review of Systems   Constitutional: Negative.  Negative for chills, diaphoresis, fatigue and fever.   Respiratory: Negative.  Negative for cough and shortness of breath.    Cardiovascular: Negative.  Negative for chest pain and palpitations.   Gastrointestinal: Negative.  " Negative for abdominal pain, bowel incontinence, diarrhea, nausea and vomiting.   Genitourinary:  Negative for bladder incontinence, dysuria and pelvic pain.   Musculoskeletal:  Positive for back pain and gait problem. Negative for arthralgias, joint swelling, myalgias and neck stiffness.   Skin: Negative.  Negative for rash.   Neurological:  Positive for weakness. Negative for dizziness, tingling, numbness and headaches.       Objective   Physical Exam  Vitals and nursing note reviewed.   Constitutional:       General: She is awake. She is not in acute distress.     Appearance: Normal appearance. She is well-developed.   HENT:      Head: Normocephalic and atraumatic.      Nose: Nose normal.      Mouth/Throat:      Lips: Pink.      Mouth: Mucous membranes are moist.      Pharynx: Oropharynx is clear.   Eyes:      Extraocular Movements: Extraocular movements intact.      Conjunctiva/sclera: Conjunctivae normal.      Pupils: Pupils are equal, round, and reactive to light.   Cardiovascular:      Rate and Rhythm: Normal rate and regular rhythm.   Pulmonary:      Effort: Pulmonary effort is normal. No respiratory distress.   Abdominal:      General: Abdomen is flat. There is no distension.   Musculoskeletal:         General: No swelling or deformity. Normal range of motion.      Cervical back: Normal and normal range of motion.      Thoracic back: Normal.      Lumbar back: Spasms and tenderness present. No swelling, deformity or bony tenderness. Normal range of motion.   Skin:     General: Skin is warm and dry.   Neurological:      General: No focal deficit present.      Mental Status: She is alert and oriented to person, place, and time. Mental status is at baseline.      Motor: Motor function is intact.      Coordination: Coordination is intact.   Psychiatric:         Mood and Affect: Mood and affect normal.         Speech: Speech normal.         Behavior: Behavior normal.         Thought Content: Thought content  normal.         Judgment: Judgment normal.         Assessment/Plan   Problem List Items Addressed This Visit    None  Visit Diagnoses       Chronic right-sided low back pain with bilateral sciatica    -  Primary    Relevant Medications    lidocaine (Lidoderm) 5 % patch    predniSONE (Deltasone) 10 mg tablet            Red flags for reporting to ER have been reviewed with the patient.    Current diagnosis, any medication changes, and all in-office lab or radiologic results have been reviewed with the patient at the time of the visit.   If symptoms do not improve or worsen, patient is to follow up with PCP or report to the emergency room.   Patient is alert and oriented x3 and non-toxic appearing. Vital signs are stable.   Patient and/or guardian has sufficient decision-making capabilities at this time and reports understanding and agreement with the treatment plan made through shared decision-making.

## 2024-01-12 RX ORDER — ALLOPURINOL 300 MG/1
300 TABLET ORAL
Qty: 30 TABLET | Refills: 0 | Status: SHIPPED | OUTPATIENT
Start: 2024-01-12 | End: 2024-01-17 | Stop reason: SDUPTHER

## 2024-01-12 NOTE — TELEPHONE ENCOUNTER
30 days of medication sent. Needs appt for her chronic med refills and labs, last refill appt was in June 2023.

## 2024-01-17 ENCOUNTER — OFFICE VISIT (OUTPATIENT)
Dept: PRIMARY CARE | Facility: CLINIC | Age: 49
End: 2024-01-17
Payer: COMMERCIAL

## 2024-01-17 ENCOUNTER — LAB (OUTPATIENT)
Dept: LAB | Facility: LAB | Age: 49
End: 2024-01-17
Payer: COMMERCIAL

## 2024-01-17 VITALS
SYSTOLIC BLOOD PRESSURE: 140 MMHG | HEART RATE: 102 BPM | WEIGHT: 227.8 LBS | OXYGEN SATURATION: 97 % | BODY MASS INDEX: 40.36 KG/M2 | DIASTOLIC BLOOD PRESSURE: 80 MMHG | TEMPERATURE: 97.9 F | HEIGHT: 63 IN

## 2024-01-17 DIAGNOSIS — E78.5 HYPERLIPIDEMIA, UNSPECIFIED HYPERLIPIDEMIA TYPE: ICD-10-CM

## 2024-01-17 DIAGNOSIS — E03.9 HYPOTHYROIDISM, UNSPECIFIED TYPE: ICD-10-CM

## 2024-01-17 DIAGNOSIS — M1A.9XX0 CHRONIC GOUT WITHOUT TOPHUS, UNSPECIFIED CAUSE, UNSPECIFIED SITE: ICD-10-CM

## 2024-01-17 DIAGNOSIS — E66.01 MORBID OBESITY (MULTI): Primary | ICD-10-CM

## 2024-01-17 DIAGNOSIS — K21.9 GASTROESOPHAGEAL REFLUX DISEASE WITHOUT ESOPHAGITIS: ICD-10-CM

## 2024-01-17 DIAGNOSIS — E03.9 HYPOTHYROIDISM, ADULT: ICD-10-CM

## 2024-01-17 DIAGNOSIS — I10 BENIGN HYPERTENSION: ICD-10-CM

## 2024-01-17 DIAGNOSIS — E55.9 VITAMIN D DEFICIENCY: ICD-10-CM

## 2024-01-17 LAB
ALBUMIN SERPL BCP-MCNC: 4.3 G/DL (ref 3.4–5)
ALP SERPL-CCNC: 53 U/L (ref 33–110)
ALT SERPL W P-5'-P-CCNC: 15 U/L (ref 7–45)
ANION GAP SERPL CALC-SCNC: 11 MMOL/L (ref 10–20)
AST SERPL W P-5'-P-CCNC: 13 U/L (ref 9–39)
BILIRUB SERPL-MCNC: 1.7 MG/DL (ref 0–1.2)
BUN SERPL-MCNC: 15 MG/DL (ref 6–23)
CALCIUM SERPL-MCNC: 9.8 MG/DL (ref 8.6–10.3)
CHLORIDE SERPL-SCNC: 104 MMOL/L (ref 98–107)
CHOLEST SERPL-MCNC: 182 MG/DL (ref 0–199)
CHOLESTEROL/HDL RATIO: 2.3
CO2 SERPL-SCNC: 29 MMOL/L (ref 21–32)
CREAT SERPL-MCNC: 1.02 MG/DL (ref 0.5–1.05)
EGFRCR SERPLBLD CKD-EPI 2021: 68 ML/MIN/1.73M*2
GLUCOSE SERPL-MCNC: 106 MG/DL (ref 74–99)
HDLC SERPL-MCNC: 79.7 MG/DL
NON-HDL CHOLESTEROL: 102 MG/DL (ref 0–149)
POTASSIUM SERPL-SCNC: 4.2 MMOL/L (ref 3.5–5.3)
PROT SERPL-MCNC: 6.5 G/DL (ref 6.4–8.2)
SODIUM SERPL-SCNC: 140 MMOL/L (ref 136–145)
TSH SERPL-ACNC: 3.68 MIU/L (ref 0.44–3.98)
URATE SERPL-MCNC: 6.5 MG/DL (ref 2.3–6.7)

## 2024-01-17 PROCEDURE — 36415 COLL VENOUS BLD VENIPUNCTURE: CPT

## 2024-01-17 PROCEDURE — 83718 ASSAY OF LIPOPROTEIN: CPT

## 2024-01-17 PROCEDURE — 99215 OFFICE O/P EST HI 40 MIN: CPT | Performed by: FAMILY MEDICINE

## 2024-01-17 PROCEDURE — 3077F SYST BP >= 140 MM HG: CPT | Performed by: FAMILY MEDICINE

## 2024-01-17 PROCEDURE — 3079F DIAST BP 80-89 MM HG: CPT | Performed by: FAMILY MEDICINE

## 2024-01-17 PROCEDURE — 84443 ASSAY THYROID STIM HORMONE: CPT

## 2024-01-17 PROCEDURE — 84550 ASSAY OF BLOOD/URIC ACID: CPT

## 2024-01-17 PROCEDURE — 82465 ASSAY BLD/SERUM CHOLESTEROL: CPT

## 2024-01-17 PROCEDURE — 80053 COMPREHEN METABOLIC PANEL: CPT

## 2024-01-17 PROCEDURE — 3008F BODY MASS INDEX DOCD: CPT | Performed by: FAMILY MEDICINE

## 2024-01-17 RX ORDER — LEVOTHYROXINE SODIUM 75 UG/1
75 TABLET ORAL DAILY
Qty: 30 TABLET | Refills: 2 | Status: SHIPPED | OUTPATIENT
Start: 2024-01-17 | End: 2024-05-02 | Stop reason: SDUPTHER

## 2024-01-17 RX ORDER — ALLOPURINOL 300 MG/1
300 TABLET ORAL
Qty: 30 TABLET | Refills: 2 | Status: SHIPPED | OUTPATIENT
Start: 2024-01-17 | End: 2024-04-16

## 2024-01-17 RX ORDER — LOSARTAN POTASSIUM 50 MG/1
50 TABLET ORAL DAILY
Qty: 30 TABLET | Refills: 2 | Status: SHIPPED | OUTPATIENT
Start: 2024-01-17 | End: 2024-05-02 | Stop reason: SDUPTHER

## 2024-01-17 RX ORDER — ERGOCALCIFEROL 1.25 MG/1
50000 CAPSULE ORAL
Qty: 4 CAPSULE | Refills: 2 | Status: SHIPPED | OUTPATIENT
Start: 2024-01-17 | End: 2024-04-16

## 2024-01-17 RX ORDER — ROSUVASTATIN CALCIUM 10 MG/1
10 TABLET, COATED ORAL DAILY
Qty: 30 TABLET | Refills: 2 | Status: SHIPPED | OUTPATIENT
Start: 2024-01-17 | End: 2024-05-02 | Stop reason: SDUPTHER

## 2024-01-17 RX ORDER — FAMOTIDINE 40 MG/1
40 TABLET, FILM COATED ORAL DAILY
Qty: 30 TABLET | Refills: 2 | Status: SHIPPED | OUTPATIENT
Start: 2024-01-17 | End: 2024-05-02 | Stop reason: SDUPTHER

## 2024-01-17 NOTE — PROGRESS NOTES
"Subjective   Patient ID: Ariane Rhodes is a 48 y.o. female who presents for Med Refill.    HPI Pt is here for refills of medications to treat the following medical conditions. Unless otherwise stated, these conditions are well-controlled, pt is taking medications s Rx, and there are no reported side effects to medications or other treatment: Hypothyroidism, GOUT, Hyperlipidemia, Hypertension. Vitamin D deficiency, GERD.     Also would like to discuss more with weight loss. Insurance doesn't cover injection for weight loss. Has been doing intermittent fasting.     Review of Systems   All other systems reviewed and are negative.      Objective   /80   Pulse 102   Temp 36.6 °C (97.9 °F)   Ht 1.6 m (5' 3\")   Wt 103 kg (227 lb 12.8 oz)   LMP  (LMP Unknown)   SpO2 97%   BMI 40.35 kg/m²     Physical Exam  Constitutional:       Appearance: Normal appearance.   HENT:      Mouth/Throat:      Mouth: Mucous membranes are moist.   Eyes:      Conjunctiva/sclera: Conjunctivae normal.   Cardiovascular:      Rate and Rhythm: Normal rate and regular rhythm.   Pulmonary:      Effort: Pulmonary effort is normal.      Breath sounds: Normal breath sounds.   Abdominal:      Palpations: Abdomen is soft.   Musculoskeletal:         General: Normal range of motion.   Skin:     General: Skin is warm and dry.   Neurological:      Mental Status: She is alert and oriented to person, place, and time.   Psychiatric:         Mood and Affect: Mood normal.         Assessment/Plan Chronic medications refilled per protocol. Added Rx for GLP1 medication at pt request for morbid obesity. Will complete prior auth as needed.          "

## 2024-01-19 ASSESSMENT — ENCOUNTER SYMPTOMS
CHILLS: 0
VOMITING: 0
PALPITATIONS: 0
SHORTNESS OF BREATH: 0
DIAPHORESIS: 0
NECK STIFFNESS: 0
GASTROINTESTINAL NEGATIVE: 1
FATIGUE: 0
CARDIOVASCULAR NEGATIVE: 1
NAUSEA: 0
MYALGIAS: 0
COUGH: 0
DIZZINESS: 0
ARTHRALGIAS: 0
DIARRHEA: 0
JOINT SWELLING: 0
RESPIRATORY NEGATIVE: 1
CONSTITUTIONAL NEGATIVE: 1

## 2024-04-25 ENCOUNTER — OFFICE VISIT (OUTPATIENT)
Dept: URGENT CARE | Facility: CLINIC | Age: 49
End: 2024-04-25
Payer: COMMERCIAL

## 2024-04-25 VITALS
OXYGEN SATURATION: 96 % | HEART RATE: 86 BPM | HEIGHT: 63 IN | DIASTOLIC BLOOD PRESSURE: 88 MMHG | WEIGHT: 221.4 LBS | BODY MASS INDEX: 39.23 KG/M2 | SYSTOLIC BLOOD PRESSURE: 132 MMHG | TEMPERATURE: 97.8 F

## 2024-04-25 DIAGNOSIS — N89.8 VAGINAL ODOR: Primary | ICD-10-CM

## 2024-04-25 PROCEDURE — 87491 CHLMYD TRACH DNA AMP PROBE: CPT

## 2024-04-25 PROCEDURE — 99213 OFFICE O/P EST LOW 20 MIN: CPT | Performed by: NURSE PRACTITIONER

## 2024-04-25 PROCEDURE — 3075F SYST BP GE 130 - 139MM HG: CPT | Performed by: NURSE PRACTITIONER

## 2024-04-25 PROCEDURE — 3008F BODY MASS INDEX DOCD: CPT | Performed by: NURSE PRACTITIONER

## 2024-04-25 PROCEDURE — 3079F DIAST BP 80-89 MM HG: CPT | Performed by: NURSE PRACTITIONER

## 2024-04-25 PROCEDURE — 87661 TRICHOMONAS VAGINALIS AMPLIF: CPT

## 2024-04-25 PROCEDURE — 87205 SMEAR GRAM STAIN: CPT

## 2024-04-25 PROCEDURE — 87591 N.GONORRHOEAE DNA AMP PROB: CPT

## 2024-04-25 RX ORDER — PAROXETINE HYDROCHLORIDE 20 MG/1
20 TABLET, FILM COATED ORAL
COMMUNITY
Start: 2023-05-19

## 2024-04-25 NOTE — PROGRESS NOTES
Subjective   Patient ID: Ariane Rhodes is a 48 y.o. female.    Patient presents today with fishy odor, itching x 4 days, patient states that she has had a new sexual partner and states she has had more intercourse than typical.  Partner states they do not have any symptoms.  She complains of itching and occasional white discharge with a fishy odor.  Denies any abdominal pain, nausea, vomiting.  No treatment prior to arrival.      History provided by:  Patient   used: No        The following portions of the chart were reviewed this encounter and updated as appropriate:         Review of Systems   All other systems reviewed and are negative.    Objective   Physical Exam  Vitals and nursing note reviewed.   Constitutional:       General: She is not in acute distress.     Appearance: Normal appearance. She is not toxic-appearing.   HENT:      Head: Normocephalic.      Right Ear: External ear normal.      Left Ear: External ear normal.      Nose: Nose normal.      Mouth/Throat:      Mouth: Mucous membranes are moist.      Pharynx: No oropharyngeal exudate or posterior oropharyngeal erythema.   Eyes:      Extraocular Movements: Extraocular movements intact.   Cardiovascular:      Rate and Rhythm: Normal rate and regular rhythm.      Heart sounds: Normal heart sounds.   Pulmonary:      Effort: Pulmonary effort is normal.      Breath sounds: Normal breath sounds. No wheezing.   Genitourinary:     Comments: Patient deferred pelvic exam she did provide urine sample for gonorrhea, chlamydia, trichomonas  Self swab for BV and yeast  Musculoskeletal:         General: Normal range of motion.      Cervical back: Normal range of motion and neck supple.   Skin:     Capillary Refill: Capillary refill takes less than 2 seconds.   Neurological:      General: No focal deficit present.      Mental Status: She is alert and oriented to person, place, and time.   Psychiatric:         Mood and Affect: Mood normal.          Behavior: Behavior normal.         Thought Content: Thought content normal.       Procedures    Assessment/Plan   Diagnoses and all orders for this visit:  Vaginal odor  -     Vaginitis Gram Stain For Bacterial Vaginosis + Yeast  -     Neisseria gonorrhoeae, Amplified  -     Chlamydia trachomatis, Amplified  -     Trichomonas vaginalis, Nucleic Acid Detection  Above testing pending  Abstain from intercourse until results are back  Will follow with results and call in appropriate treatment as needed  Patient agrees    Above plan of care was reviewed with the patient, all questions were answered, through shared decision making the patient agrees with this plan of care.    Red flags for strict return precaution have been reviewed with the patient and or patient gaurdian, patient is alert, oriented and non-toxic appearing, has decision making capabilities and agrees with the plan of care through shared decision making at this time. Current diagnosis, any medication changes, lab or radiologic results have been reviewed with the patient at the time of visit. If symptoms do not improve, or worsen, patient is to follow up with PCP or report to the emergency room.   Patient is alert and oriented x3 vital signs stable nontoxic-appearing and has decision-making capabilities.    Please note that the majority this note was made with Dragon speaking software there may be grammatical errors secondary to the speaking program.      Patient disposition: Home

## 2024-04-26 LAB
BACTERIAL VAGINOSIS VAG-IMP: NORMAL
C TRACH RRNA SPEC QL NAA+PROBE: NEGATIVE
CLUE CELLS VAG LPF-#/AREA: NORMAL /[LPF]
N GONORRHOEA DNA SPEC QL PROBE+SIG AMP: NEGATIVE
NUGENT SCORE: 6
T VAGINALIS RRNA SPEC QL NAA+PROBE: NEGATIVE
YEAST VAG WET PREP-#/AREA: NORMAL

## 2024-05-02 DIAGNOSIS — I10 BENIGN HYPERTENSION: ICD-10-CM

## 2024-05-02 DIAGNOSIS — K21.9 GASTROESOPHAGEAL REFLUX DISEASE WITHOUT ESOPHAGITIS: ICD-10-CM

## 2024-05-02 DIAGNOSIS — E03.9 HYPOTHYROIDISM, ADULT: ICD-10-CM

## 2024-05-02 DIAGNOSIS — E78.5 HYPERLIPIDEMIA, UNSPECIFIED HYPERLIPIDEMIA TYPE: ICD-10-CM

## 2024-05-02 DIAGNOSIS — B96.89 BACTERIAL VAGINOSIS: Primary | ICD-10-CM

## 2024-05-02 DIAGNOSIS — N76.0 BACTERIAL VAGINOSIS: Primary | ICD-10-CM

## 2024-05-02 RX ORDER — LEVOTHYROXINE SODIUM 75 UG/1
75 TABLET ORAL DAILY
Qty: 30 TABLET | Refills: 0 | Status: SHIPPED | OUTPATIENT
Start: 2024-05-02 | End: 2024-05-15 | Stop reason: SDUPTHER

## 2024-05-02 RX ORDER — METRONIDAZOLE 500 MG/1
500 TABLET ORAL 2 TIMES DAILY
Qty: 14 TABLET | Refills: 0 | Status: SHIPPED | OUTPATIENT
Start: 2024-05-02 | End: 2024-05-15 | Stop reason: WASHOUT

## 2024-05-02 RX ORDER — LOSARTAN POTASSIUM 50 MG/1
50 TABLET ORAL DAILY
Qty: 30 TABLET | Refills: 0 | Status: SHIPPED | OUTPATIENT
Start: 2024-05-02 | End: 2024-05-15 | Stop reason: SDUPTHER

## 2024-05-02 RX ORDER — ROSUVASTATIN CALCIUM 10 MG/1
10 TABLET, COATED ORAL DAILY
Qty: 30 TABLET | Refills: 0 | Status: SHIPPED | OUTPATIENT
Start: 2024-05-02 | End: 2024-05-15 | Stop reason: SDUPTHER

## 2024-05-02 RX ORDER — FAMOTIDINE 40 MG/1
40 TABLET, FILM COATED ORAL DAILY
Qty: 30 TABLET | Refills: 0 | Status: SHIPPED | OUTPATIENT
Start: 2024-05-02 | End: 2024-05-15 | Stop reason: SDUPTHER

## 2024-05-02 NOTE — TELEPHONE ENCOUNTER
Patient called, has upcoming appointment but will be out of medications. Order placed. Please approve.     Also patient was recently seen by  Urgent Care, still has vaginal itching and is requesting medication for treatment.

## 2024-05-14 ENCOUNTER — APPOINTMENT (OUTPATIENT)
Dept: PRIMARY CARE | Facility: CLINIC | Age: 49
End: 2024-05-14
Payer: COMMERCIAL

## 2024-05-15 ENCOUNTER — LAB (OUTPATIENT)
Dept: LAB | Facility: LAB | Age: 49
End: 2024-05-15
Payer: COMMERCIAL

## 2024-05-15 ENCOUNTER — OFFICE VISIT (OUTPATIENT)
Dept: PRIMARY CARE | Facility: CLINIC | Age: 49
End: 2024-05-15
Payer: COMMERCIAL

## 2024-05-15 VITALS
BODY MASS INDEX: 38.09 KG/M2 | WEIGHT: 215 LBS | SYSTOLIC BLOOD PRESSURE: 130 MMHG | DIASTOLIC BLOOD PRESSURE: 80 MMHG | HEIGHT: 63 IN | HEART RATE: 90 BPM | OXYGEN SATURATION: 98 %

## 2024-05-15 DIAGNOSIS — Z11.3 SCREEN FOR STD (SEXUALLY TRANSMITTED DISEASE): ICD-10-CM

## 2024-05-15 DIAGNOSIS — M10.9 GOUT, UNSPECIFIED CAUSE, UNSPECIFIED CHRONICITY, UNSPECIFIED SITE: ICD-10-CM

## 2024-05-15 DIAGNOSIS — Z12.11 ENCOUNTER FOR SCREENING FOR MALIGNANT NEOPLASM OF COLON: ICD-10-CM

## 2024-05-15 DIAGNOSIS — K21.9 GASTROESOPHAGEAL REFLUX DISEASE WITHOUT ESOPHAGITIS: ICD-10-CM

## 2024-05-15 DIAGNOSIS — E03.9 HYPOTHYROIDISM, ADULT: ICD-10-CM

## 2024-05-15 DIAGNOSIS — Z00.00 ROUTINE GENERAL MEDICAL EXAMINATION AT A HEALTH CARE FACILITY: Primary | ICD-10-CM

## 2024-05-15 DIAGNOSIS — F31.81 BIPOLAR 2 DISORDER (MULTI): ICD-10-CM

## 2024-05-15 DIAGNOSIS — I10 BENIGN HYPERTENSION: ICD-10-CM

## 2024-05-15 DIAGNOSIS — E78.5 HYPERLIPIDEMIA, UNSPECIFIED HYPERLIPIDEMIA TYPE: ICD-10-CM

## 2024-05-15 LAB
ALBUMIN SERPL BCP-MCNC: 4 G/DL (ref 3.4–5)
ALP SERPL-CCNC: 49 U/L (ref 33–110)
ALT SERPL W P-5'-P-CCNC: 13 U/L (ref 7–45)
ANION GAP SERPL CALC-SCNC: 14 MMOL/L (ref 10–20)
AST SERPL W P-5'-P-CCNC: 15 U/L (ref 9–39)
BILIRUB SERPL-MCNC: 0.9 MG/DL (ref 0–1.2)
BUN SERPL-MCNC: 9 MG/DL (ref 6–23)
CALCIUM SERPL-MCNC: 8.7 MG/DL (ref 8.6–10.3)
CHLORIDE SERPL-SCNC: 105 MMOL/L (ref 98–107)
CHOLEST SERPL-MCNC: 214 MG/DL (ref 0–199)
CHOLESTEROL/HDL RATIO: 2.7
CO2 SERPL-SCNC: 26 MMOL/L (ref 21–32)
CREAT SERPL-MCNC: 0.82 MG/DL (ref 0.5–1.05)
EGFRCR SERPLBLD CKD-EPI 2021: 88 ML/MIN/1.73M*2
GLUCOSE SERPL-MCNC: 97 MG/DL (ref 74–99)
HAV IGM SER QL: NONREACTIVE
HBV CORE IGM SER QL: NONREACTIVE
HBV SURFACE AG SERPL QL IA: NONREACTIVE
HCV AB SER QL: NONREACTIVE
HDLC SERPL-MCNC: 78.1 MG/DL
HIV 1+2 AB+HIV1 P24 AG SERPL QL IA: NONREACTIVE
LDLC SERPL CALC-MCNC: 114 MG/DL
NON HDL CHOLESTEROL: 136 MG/DL (ref 0–149)
POTASSIUM SERPL-SCNC: 4.2 MMOL/L (ref 3.5–5.3)
PROT SERPL-MCNC: 6.2 G/DL (ref 6.4–8.2)
SODIUM SERPL-SCNC: 141 MMOL/L (ref 136–145)
TRIGL SERPL-MCNC: 112 MG/DL (ref 0–149)
TSH SERPL-ACNC: 2.62 MIU/L (ref 0.44–3.98)
URATE SERPL-MCNC: 6.3 MG/DL (ref 2.3–6.7)
VLDL: 22 MG/DL (ref 0–40)

## 2024-05-15 PROCEDURE — 3075F SYST BP GE 130 - 139MM HG: CPT | Performed by: FAMILY MEDICINE

## 2024-05-15 PROCEDURE — 99396 PREV VISIT EST AGE 40-64: CPT | Performed by: FAMILY MEDICINE

## 2024-05-15 PROCEDURE — 3079F DIAST BP 80-89 MM HG: CPT | Performed by: FAMILY MEDICINE

## 2024-05-15 PROCEDURE — 36415 COLL VENOUS BLD VENIPUNCTURE: CPT

## 2024-05-15 PROCEDURE — 87389 HIV-1 AG W/HIV-1&-2 AB AG IA: CPT

## 2024-05-15 PROCEDURE — 80053 COMPREHEN METABOLIC PANEL: CPT

## 2024-05-15 PROCEDURE — 99214 OFFICE O/P EST MOD 30 MIN: CPT | Performed by: FAMILY MEDICINE

## 2024-05-15 PROCEDURE — 84550 ASSAY OF BLOOD/URIC ACID: CPT

## 2024-05-15 PROCEDURE — 80074 ACUTE HEPATITIS PANEL: CPT

## 2024-05-15 PROCEDURE — 80061 LIPID PANEL: CPT

## 2024-05-15 PROCEDURE — 84443 ASSAY THYROID STIM HORMONE: CPT

## 2024-05-15 PROCEDURE — 3008F BODY MASS INDEX DOCD: CPT | Performed by: FAMILY MEDICINE

## 2024-05-15 RX ORDER — LAMOTRIGINE 150 MG/1
TABLET ORAL
COMMUNITY
Start: 2024-04-18

## 2024-05-15 RX ORDER — ALLOPURINOL 300 MG/1
300 TABLET ORAL DAILY
Qty: 90 TABLET | Refills: 0 | Status: SHIPPED | OUTPATIENT
Start: 2024-05-15 | End: 2024-08-13

## 2024-05-15 RX ORDER — LEVOTHYROXINE SODIUM 75 UG/1
75 TABLET ORAL DAILY
Qty: 90 TABLET | Refills: 0 | Status: SHIPPED | OUTPATIENT
Start: 2024-05-15 | End: 2024-08-13

## 2024-05-15 RX ORDER — LOSARTAN POTASSIUM 50 MG/1
50 TABLET ORAL DAILY
Qty: 90 TABLET | Refills: 0 | Status: SHIPPED | OUTPATIENT
Start: 2024-05-15 | End: 2024-08-13

## 2024-05-15 RX ORDER — ROSUVASTATIN CALCIUM 10 MG/1
10 TABLET, COATED ORAL DAILY
Qty: 90 TABLET | Refills: 0 | Status: SHIPPED | OUTPATIENT
Start: 2024-05-15 | End: 2024-08-13

## 2024-05-15 RX ORDER — FAMOTIDINE 40 MG/1
40 TABLET, FILM COATED ORAL DAILY
Qty: 90 TABLET | Refills: 0 | Status: SHIPPED | OUTPATIENT
Start: 2024-05-15 | End: 2024-08-13

## 2024-05-15 NOTE — PROGRESS NOTES
"Subjective   Patient ID: Ariane Rhodes is a 48 y.o. female who presents for Med Refill and her well exam. She is fasting for labs.     HPI Pt is here for refills of medications to treat the following medical conditions. Unless otherwise stated, these conditions are well-controlled, pt is taking medications s Rx, and there are no reported side effects to medications or other treatment: Hypothyroidism, Hyperlipidemia, GOUT - Has been out of Allopurinol since March of 2024. Hypertension.     Also states she is a recovering alcoholic, father recently passed away, started drinking again, Is drinking about 1-2 times throughout the week averaging about 5-10 drinks each time. Is having constant  sex urges. Recently slept with someone that's bisexual and is requesting STD testing.     Review of Systems   All other systems reviewed and are negative.      Objective   /80   Pulse 90   Ht 1.6 m (5' 3\")   Wt 97.5 kg (215 lb)   LMP  (LMP Unknown)   SpO2 98%   BMI 38.09 kg/m²     Physical Exam  Constitutional:       Appearance: Normal appearance.   HENT:      Mouth/Throat:      Mouth: Mucous membranes are moist.   Eyes:      Conjunctiva/sclera: Conjunctivae normal.   Cardiovascular:      Rate and Rhythm: Normal rate and regular rhythm.   Pulmonary:      Effort: Pulmonary effort is normal.      Breath sounds: Normal breath sounds.   Abdominal:      Palpations: Abdomen is soft.   Musculoskeletal:         General: Normal range of motion.   Skin:     General: Skin is warm and dry.   Neurological:      Mental Status: She is alert and oriented to person, place, and time.   Psychiatric:         Mood and Affect: Mood normal.         Assessment/Plan   Diagnoses and all orders for this visit:  Routine general medical examination at a health care facility  -     Lipid panel; Future  -     Comprehensive metabolic panel; Future  -     Cologuard® colon cancer screening; Future  Hyperlipidemia, unspecified hyperlipidemia type  -   "   rosuvastatin (Crestor) 10 mg tablet; Take 1 tablet (10 mg) by mouth once daily. Takes 0.5mg daily  Benign hypertension  -     losartan (Cozaar) 50 mg tablet; Take 1 tablet (50 mg) by mouth once daily.  Gastroesophageal reflux disease without esophagitis  -     famotidine (Pepcid) 40 mg tablet; Take 1 tablet (40 mg) by mouth once daily.  Hypothyroidism, adult  -     TSH; Future  -     levothyroxine (Synthroid, Levoxyl) 75 mcg tablet; Take 1 tablet (75 mcg) by mouth once daily.  Gout, unspecified cause, unspecified chronicity, unspecified site  -     Uric acid; Future  -     allopurinol (Zyloprim) 300 mg tablet; Take 1 tablet (300 mg) by mouth once daily.  Screen for STD (sexually transmitted disease)  -     HIV 1/2 Antigen/Antibody Screen with Reflex to Confirmation; Future  -     Hepatitis panel, acute; Future  Encounter for screening for malignant neoplasm of colon  -     Cologuard® colon cancer screening; Future  Bipolar 2 disorder (Multi)

## 2024-06-18 LAB — NONINV COLON CA DNA+OCC BLD SCRN STL QL: NEGATIVE

## 2025-09-18 ENCOUNTER — APPOINTMENT (OUTPATIENT)
Dept: PRIMARY CARE | Facility: CLINIC | Age: 50
End: 2025-09-18
Payer: COMMERCIAL